# Patient Record
(demographics unavailable — no encounter records)

---

## 2024-10-18 NOTE — PHYSICAL EXAM
[Alert] : alert [No Acute Distress] : no acute distress [Normocephalic] : normocephalic [Conjunctivae with no discharge] : conjunctivae with no discharge [PERRL] : PERRL [EOMI Bilateral] : EOMI bilateral [Auricles Well Formed] : auricles well formed [Clear Tympanic membranes with present light reflex and bony landmarks] : clear tympanic membranes with present light reflex and bony landmarks [No Discharge] : no discharge [Nares Patent] : nares patent [Pink Nasal Mucosa] : pink nasal mucosa [Palate Intact] : palate intact [Nonerythematous Oropharynx] : nonerythematous oropharynx [Supple, full passive range of motion] : supple, full passive range of motion [No Palpable Masses] : no palpable masses [Symmetric Chest Rise] : symmetric chest rise [Clear to Auscultation Bilaterally] : clear to auscultation bilaterally [Regular Rate and Rhythm] : regular rate and rhythm [Normal S1, S2 present] : normal S1, S2 present [No Murmurs] : no murmurs [+2 Femoral Pulses] : +2 femoral pulses [Soft] : soft [NonTender] : non tender [Non Distended] : non distended [Normoactive Bowel Sounds] : normoactive bowel sounds [No Hepatomegaly] : no hepatomegaly [No Splenomegaly] : no splenomegaly [Niels: ____] : Niels [unfilled] [Niels: _____] : Niels [unfilled] [Patent] : patent [No fissures] : no fissures [No Abnormal Lymph Nodes Palpated] : no abnormal lymph nodes palpated [No Gait Asymmetry] : no gait asymmetry [No pain or deformities with palpation of bone, muscles, joints] : no pain or deformities with palpation of bone, muscles, joints [Normal Muscle Tone] : normal muscle tone [Straight] : straight [+2 Patella DTR] : +2 patella DTR [Cranial Nerves Grossly Intact] : cranial nerves grossly intact [No Rash or Lesions] : no rash or lesions

## 2024-10-18 NOTE — BEGINNING OF VISIT
[] :  [[employer] ] : provided by [employer]   [Time Spent: ____ minutes] : Total time spent using  services: [unfilled] minutes. The patient's primary language is not English thus required  services. [Interpreters_IDNumber] : [id number] [Interpreters_FullName] : Tenisha

## 2024-10-18 NOTE — HISTORY OF PRESENT ILLNESS
[Mother] : mother [Father] : father [Normal] : Normal [No] : Patient does not go to dentist yearly [de-identified] : Prefers junk food vs table food. Drinks soda [FreeTextEntry3] : Trouble Falling asleep, will be up all night. talks to self [FreeTextEntry9] : Sometimes dolls, mostly talks to walls and imaginary friends.  [de-identified] : see below [FreeTextEntry1] :  Psx: Med: None All: None Developmentally: Never concerns  ---  "does not learn anything" Previously in [place] in [place] came [place] the [place] in [place] Was seeing psychologist  Parents feel she selectively choose when she wants to speak. Wonder if she is "faking it"   School currently placed her in 2nd grade.   + imaginary friend. Talks with "walls" and "Chairs". Has been happening since she was younger.

## 2024-10-18 NOTE — HISTORY OF PRESENT ILLNESS
[Mother] : mother [Father] : father [Normal] : Normal [No] : Patient does not go to dentist yearly [de-identified] : Prefers junk food vs table food. Drinks soda [FreeTextEntry3] : Trouble Falling asleep, will be up all night. talks to self [FreeTextEntry9] : Sometimes dolls, mostly talks to walls and imaginary friends.  [de-identified] : see below [FreeTextEntry1] :  Psx: Med: None All: None Developmentally: Never concerns  ---  "does not learn anything" Previously in [place] in [place] came [place] the [place] in [place] Was seeing psychologist  Parents feel she selectively choose when she wants to speak. Wonder if she is "faking it"   School currently placed her in 2nd grade.   + imaginary friend. Talks with "walls" and "Chairs". Has been happening since she was younger.

## 2024-10-18 NOTE — DISCUSSION/SUMMARY
[FreeTextEntry1] :  8 yo female here for [facility]  Learning Disability - Needs further evaluation to determine if language based or cognitive delay - Discussed process with parents - To have Psychoeducational evaluation  Behaviors - Parents report Mutism? and talking to objects. Unclear exactly despite using .  - Recommend Psychiatry evaluation  [facility] - Continue balanced diet with all food groups. - Brush teeth twice a day with toothbrush. Recommend visit to dentist at least yearly. - Help child to maintain consistent daily routines and sleep schedule.  - School discussed. Ensure home is safe. Teach child about personal safety. Use consistent, positive discipline.  - Limit screen time to no more than 2 hours per day.  - Encourage physical activity.  - Flu - Return 1 year for routine well child check.

## 2024-10-18 NOTE — BEGINNING OF VISIT
[] :  [[employer] ] : provided by [employer]   [Time Spent: ____ minutes] : Total time spent using  services: [unfilled] minutes. The patient's primary language is not English thus required  services. [Interpreters_IDNumber] : [id number] [Interpreters_FullName] : Tenisha

## 2024-10-18 NOTE — DISCUSSION/SUMMARY
[FreeTextEntry1] :  8 yo female here for [facility]  Learning Disability - Needs further evaluation to determine if language based or cognitive delay - Discussed process with parents - To have Psychoeducational evaluation  Behaviors - Parents report Mutism? and talking to objects. Unclear exactly despite using .  - Recommend Psychiatry evaluation  [facility] - Continue balanced diet with all food groups. - Brush teeth twice a day with toothbrush. Recommend visit to dentist at least yearly. - Help child to maintain consistent daily routines and sleep schedule.  - School discussed. Ensure home is safe. Teach child about personal safety. Use consistent, positive discipline.  - Limit screen time to no more than 2 hours per day.  - Encourage physical activity.  - Flu - Return 1 year for routine well child check.